# Patient Record
Sex: FEMALE | Race: BLACK OR AFRICAN AMERICAN | URBAN - METROPOLITAN AREA
[De-identification: names, ages, dates, MRNs, and addresses within clinical notes are randomized per-mention and may not be internally consistent; named-entity substitution may affect disease eponyms.]

---

## 2020-06-17 ENCOUNTER — LAB OUTSIDE AN ENCOUNTER (OUTPATIENT)
Dept: URBAN - METROPOLITAN AREA CLINIC 23 | Facility: CLINIC | Age: 45
End: 2020-06-17

## 2020-06-17 ENCOUNTER — OFFICE VISIT (OUTPATIENT)
Dept: URBAN - METROPOLITAN AREA CLINIC 23 | Facility: CLINIC | Age: 45
End: 2020-06-17
Payer: COMMERCIAL

## 2020-06-17 ENCOUNTER — WEB ENCOUNTER (OUTPATIENT)
Dept: URBAN - METROPOLITAN AREA CLINIC 23 | Facility: CLINIC | Age: 45
End: 2020-06-17

## 2020-06-17 DIAGNOSIS — R11.2 NAUSEA AND VOMITING: ICD-10-CM

## 2020-06-17 DIAGNOSIS — R10.13 EPIGASTRIC PAIN: ICD-10-CM

## 2020-06-17 DIAGNOSIS — R14.0 ABDOMINAL BLOATING: ICD-10-CM

## 2020-06-17 DIAGNOSIS — R12 HEARTBURN: ICD-10-CM

## 2020-06-17 DIAGNOSIS — R19.7 DIARRHEA: ICD-10-CM

## 2020-06-17 PROCEDURE — G8417 CALC BMI ABV UP PARAM F/U: HCPCS | Performed by: INTERNAL MEDICINE

## 2020-06-17 PROCEDURE — 1036F TOBACCO NON-USER: CPT | Performed by: INTERNAL MEDICINE

## 2020-06-17 PROCEDURE — 99204 OFFICE O/P NEW MOD 45 MIN: CPT | Performed by: INTERNAL MEDICINE

## 2020-06-17 PROCEDURE — G8427 DOCREV CUR MEDS BY ELIG CLIN: HCPCS | Performed by: INTERNAL MEDICINE

## 2020-06-17 PROCEDURE — G9903 PT SCRN TBCO ID AS NON USER: HCPCS | Performed by: INTERNAL MEDICINE

## 2020-06-17 NOTE — HPI-TODAY'S VISIT:
- 43 yo  female from [place], referred by Dr. El Reynaga for further evaluation of GI complaints as detailed below, which have been ongoing for approximately 5 years but which have progressed over the past 6 months.  Her symptoms were previously intermittent but now they have become constant. - Abdominal pain which is constant.  Located in epigastric and LUQ areas and may radiate up into her chest.  Describes it shooting pains which range from 6-10/10 in intensity.   - Associated symptoms include abdominal bloating, nausea, vomiting, heartburn, dyspepsia.  In addition, she states that most days of the week she has diarrhea.  Rare constipation. - She is taking ranitidine and omeprazole as needed, with poor control of symptoms. - Does not take ASA or NSAIDs - Consumes about 9 alcoholic beverages per week

## 2020-06-26 ENCOUNTER — LAB OUTSIDE AN ENCOUNTER (OUTPATIENT)
Dept: URBAN - METROPOLITAN AREA CLINIC 23 | Facility: CLINIC | Age: 45
End: 2020-06-26

## 2020-06-29 LAB — GASTROINTESTINAL PATHOGEN: (no result)

## 2020-07-02 LAB
A/G RATIO: 1.2
ALBUMIN: 4.1
ALKALINE PHOSPHATASE: 83
ALT (SGPT): 16
AST (SGOT): 17
BASO (ABSOLUTE): 0
BASOS: 1
BILIRUBIN, TOTAL: 0.2
BUN/CREATININE RATIO: 8
BUN: 5
C-REACTIVE PROTEIN, QUANT: 1
CALCIUM: 9.1
CARBON DIOXIDE, TOTAL: 23
CHLORIDE: 100
CREATININE: 0.65
EGFR IF AFRICN AM: 123
EGFR IF NONAFRICN AM: 107
EOS (ABSOLUTE): 0.1
EOS: 1
GLOBULIN, TOTAL: 3.3
GLUCOSE: 97
HEMATOCRIT: 39.5
HEMATOLOGY COMMENTS:: (no result)
HEMOGLOBIN: 13.7
IMMATURE CELLS: (no result)
IMMATURE GRANS (ABS): 0
IMMATURE GRANULOCYTES: 0
IMMUNOGLOBULIN A, QN, SERUM: 361
LYMPHS (ABSOLUTE): 2.5
LYMPHS: 29
MCH: 30.7
MCHC: 34.7
MCV: 89
MONOCYTES(ABSOLUTE): 0.6
MONOCYTES: 7
NEUTROPHILS (ABSOLUTE): 5.5
NEUTROPHILS: 62
NRBC: (no result)
PLATELETS: 346
POTASSIUM: 4.6
PROTEIN, TOTAL: 7.4
RBC: 4.46
RDW: 13.4
SEDIMENTATION RATE-WESTERGREN: 6
SODIUM: 140
T-TRANSGLUTAMINASE (TTG) IGA: <2
WBC: 8.8

## 2020-07-08 ENCOUNTER — LAB OUTSIDE AN ENCOUNTER (OUTPATIENT)
Dept: URBAN - METROPOLITAN AREA CLINIC 92 | Facility: CLINIC | Age: 45
End: 2020-07-08

## 2020-07-08 ENCOUNTER — OFFICE VISIT (OUTPATIENT)
Dept: URBAN - METROPOLITAN AREA SURGERY CENTER 15 | Facility: SURGERY CENTER | Age: 45
End: 2020-07-08
Payer: COMMERCIAL

## 2020-07-08 ENCOUNTER — TELEPHONE ENCOUNTER (OUTPATIENT)
Dept: URBAN - METROPOLITAN AREA CLINIC 92 | Facility: CLINIC | Age: 45
End: 2020-07-08

## 2020-07-08 DIAGNOSIS — R19.4 ALTERED BOWEL HABITS: ICD-10-CM

## 2020-07-08 DIAGNOSIS — K21.9 ACID REFLUX: ICD-10-CM

## 2020-07-08 DIAGNOSIS — K31.89 ACQUIRED DEFORMITY OF PYLORUS: ICD-10-CM

## 2020-07-08 DIAGNOSIS — K29.80 ACUTE DUODENITIS: ICD-10-CM

## 2020-07-08 DIAGNOSIS — D12.0 BENIGN NEOPLASM OF CECUM: ICD-10-CM

## 2020-07-08 DIAGNOSIS — K31.7 BENIGN GASTRIC POLYP: ICD-10-CM

## 2020-07-08 PROCEDURE — G8907 PT DOC NO EVENTS ON DISCHARG: HCPCS | Performed by: INTERNAL MEDICINE

## 2020-07-08 PROCEDURE — 45380 COLONOSCOPY AND BIOPSY: CPT | Performed by: INTERNAL MEDICINE

## 2020-07-08 PROCEDURE — 45385 COLONOSCOPY W/LESION REMOVAL: CPT | Performed by: INTERNAL MEDICINE

## 2020-07-08 PROCEDURE — 43239 EGD BIOPSY SINGLE/MULTIPLE: CPT | Performed by: INTERNAL MEDICINE

## 2020-07-08 PROCEDURE — 43251 EGD REMOVE LESION SNARE: CPT | Performed by: INTERNAL MEDICINE

## 2020-07-08 PROCEDURE — G9937 DIG OR SURV COLSCO: HCPCS | Performed by: INTERNAL MEDICINE

## 2020-08-17 ENCOUNTER — OFFICE VISIT (OUTPATIENT)
Dept: URBAN - METROPOLITAN AREA TELEHEALTH 2 | Facility: TELEHEALTH | Age: 45
End: 2020-08-17
Payer: COMMERCIAL

## 2020-08-17 DIAGNOSIS — R93.2 ABNORMAL GALL BLADDER DIAGNOSTIC IMAGING: ICD-10-CM

## 2020-08-17 DIAGNOSIS — K63.5 COLON POLYPS: ICD-10-CM

## 2020-08-17 DIAGNOSIS — R93.5 ABNORMAL CT OF THE ABDOMEN: ICD-10-CM

## 2020-08-17 PROCEDURE — 1036F TOBACCO NON-USER: CPT | Performed by: INTERNAL MEDICINE

## 2020-08-17 PROCEDURE — G8420 CALC BMI NORM PARAMETERS: HCPCS | Performed by: INTERNAL MEDICINE

## 2020-08-17 PROCEDURE — 99214 OFFICE O/P EST MOD 30 MIN: CPT | Performed by: INTERNAL MEDICINE

## 2020-08-17 PROCEDURE — G8427 DOCREV CUR MEDS BY ELIG CLIN: HCPCS | Performed by: INTERNAL MEDICINE

## 2020-08-17 PROCEDURE — G9903 PT SCRN TBCO ID AS NON USER: HCPCS | Performed by: INTERNAL MEDICINE

## 2020-08-17 NOTE — HPI-TODAY'S VISIT:
- 43 yo female - Two months ago had negative bloodwork and negative stool PCR for infectious pathogens.   - 7/8/2020 E/C with tubular adenoma - She also had an abdominopelvic CT done 2 months ago which revealed a 4 cm ovarian dermoid cyst and was suggestive of cholelithiasis.  I had ordered a RUQ ultrasound for further evaluation but the patient has not yet had it done. - She is taking omeprazole 20 mg daily and feels better from the upper GI symptoms she previously described below  - States she saw her gynecologist earlier today regarding her ovarian dermoid cyst and that she had a transvaginal ultrasound done earlier today  - Has cut down on her alcohol use to 5 glasses of wine per week

## 2020-08-28 ENCOUNTER — OFFICE VISIT (OUTPATIENT)
Dept: URBAN - METROPOLITAN AREA TELEHEALTH 2 | Facility: TELEHEALTH | Age: 45
End: 2020-08-28

## 2020-09-05 ENCOUNTER — DASHBOARD ENCOUNTERS (OUTPATIENT)
Age: 45
End: 2020-09-05

## 2020-12-08 ENCOUNTER — INPATIENT (INPATIENT)
Facility: HOSPITAL | Age: 45
LOS: 1 days | Discharge: HOME | DRG: 460 | End: 2020-12-10
Attending: ORTHOPAEDIC SURGERY | Admitting: ORTHOPAEDIC SURGERY
Payer: MEDICARE

## 2020-12-08 VITALS
RESPIRATION RATE: 16 BRPM | HEIGHT: 63 IN | OXYGEN SATURATION: 99 % | SYSTOLIC BLOOD PRESSURE: 122 MMHG | HEART RATE: 67 BPM | DIASTOLIC BLOOD PRESSURE: 69 MMHG | TEMPERATURE: 97 F | WEIGHT: 173.06 LBS

## 2020-12-08 DIAGNOSIS — Z98.890 OTHER SPECIFIED POSTPROCEDURAL STATES: Chronic | ICD-10-CM

## 2020-12-08 DIAGNOSIS — M54.16 RADICULOPATHY, LUMBAR REGION: ICD-10-CM

## 2020-12-08 LAB
ANION GAP SERPL CALC-SCNC: 10 MMOL/L — SIGNIFICANT CHANGE UP (ref 5–17)
BUN SERPL-MCNC: 14 MG/DL — SIGNIFICANT CHANGE UP (ref 7–23)
CALCIUM SERPL-MCNC: 8.6 MG/DL — SIGNIFICANT CHANGE UP (ref 8.4–10.5)
CHLORIDE SERPL-SCNC: 101 MMOL/L — SIGNIFICANT CHANGE UP (ref 96–108)
CO2 SERPL-SCNC: 24 MMOL/L — SIGNIFICANT CHANGE UP (ref 22–31)
CREAT SERPL-MCNC: 1.06 MG/DL — SIGNIFICANT CHANGE UP (ref 0.5–1.3)
GLUCOSE SERPL-MCNC: 115 MG/DL — HIGH (ref 70–99)
POTASSIUM SERPL-MCNC: 5 MMOL/L — SIGNIFICANT CHANGE UP (ref 3.5–5.3)
POTASSIUM SERPL-SCNC: 5 MMOL/L — SIGNIFICANT CHANGE UP (ref 3.5–5.3)
SODIUM SERPL-SCNC: 135 MMOL/L — SIGNIFICANT CHANGE UP (ref 135–145)

## 2020-12-08 RX ORDER — HYDROMORPHONE HYDROCHLORIDE 2 MG/ML
0.5 INJECTION INTRAMUSCULAR; INTRAVENOUS; SUBCUTANEOUS EVERY 4 HOURS
Refills: 0 | Status: ACTIVE | OUTPATIENT
Start: 2020-12-08 | End: 2020-12-15

## 2020-12-08 RX ORDER — MAGNESIUM SULFATE 500 MG/ML
2 VIAL (ML) INJECTION
Refills: 0 | Status: DISCONTINUED | OUTPATIENT
Start: 2020-12-08 | End: 2020-12-10

## 2020-12-08 RX ORDER — ACETAMINOPHEN 500 MG
1000 TABLET ORAL ONCE
Refills: 0 | Status: COMPLETED | OUTPATIENT
Start: 2020-12-08 | End: 2020-12-08

## 2020-12-08 RX ORDER — DEXAMETHASONE 0.5 MG/5ML
10 ELIXIR ORAL EVERY 8 HOURS
Refills: 0 | Status: COMPLETED | OUTPATIENT
Start: 2020-12-08 | End: 2020-12-09

## 2020-12-08 RX ORDER — SODIUM CHLORIDE 9 MG/ML
1000 INJECTION, SOLUTION INTRAVENOUS
Refills: 0 | Status: DISCONTINUED | OUTPATIENT
Start: 2020-12-08 | End: 2020-12-09

## 2020-12-08 RX ORDER — ACETAMINOPHEN 500 MG
650 TABLET ORAL EVERY 6 HOURS
Refills: 0 | Status: ACTIVE | OUTPATIENT
Start: 2020-12-08 | End: 2021-11-06

## 2020-12-08 RX ORDER — CYCLOBENZAPRINE HYDROCHLORIDE 10 MG/1
5 TABLET, FILM COATED ORAL EVERY 8 HOURS
Refills: 0 | Status: ACTIVE | OUTPATIENT
Start: 2020-12-08 | End: 2021-11-06

## 2020-12-08 RX ORDER — APREPITANT 80 MG/1
40 CAPSULE ORAL ONCE
Refills: 0 | Status: COMPLETED | OUTPATIENT
Start: 2020-12-08 | End: 2020-12-08

## 2020-12-08 RX ORDER — POVIDONE-IODINE 5 %
1 AEROSOL (ML) TOPICAL ONCE
Refills: 0 | Status: COMPLETED | OUTPATIENT
Start: 2020-12-08 | End: 2020-12-08

## 2020-12-08 RX ORDER — CEFAZOLIN SODIUM 1 G
2000 VIAL (EA) INJECTION EVERY 8 HOURS
Refills: 0 | Status: COMPLETED | OUTPATIENT
Start: 2020-12-08 | End: 2020-12-09

## 2020-12-08 RX ORDER — GABAPENTIN 400 MG/1
1 CAPSULE ORAL
Qty: 0 | Refills: 0 | DISCHARGE

## 2020-12-08 RX ORDER — OXYCODONE HYDROCHLORIDE 5 MG/1
5 TABLET ORAL EVERY 4 HOURS
Refills: 0 | Status: ACTIVE | OUTPATIENT
Start: 2020-12-08 | End: 2020-12-15

## 2020-12-08 RX ORDER — INFLUENZA VIRUS VACCINE 15; 15; 15; 15 UG/.5ML; UG/.5ML; UG/.5ML; UG/.5ML
0.5 SUSPENSION INTRAMUSCULAR ONCE
Refills: 0 | Status: ACTIVE | OUTPATIENT
Start: 2020-12-08 | End: 2021-11-06

## 2020-12-08 RX ORDER — ONDANSETRON 8 MG/1
4 TABLET, FILM COATED ORAL EVERY 6 HOURS
Refills: 0 | Status: ACTIVE | OUTPATIENT
Start: 2020-12-08 | End: 2021-11-06

## 2020-12-08 RX ORDER — GABAPENTIN 400 MG/1
300 CAPSULE ORAL ONCE
Refills: 0 | Status: COMPLETED | OUTPATIENT
Start: 2020-12-08 | End: 2020-12-08

## 2020-12-08 RX ORDER — HYDROMORPHONE HYDROCHLORIDE 2 MG/ML
0.5 INJECTION INTRAMUSCULAR; INTRAVENOUS; SUBCUTANEOUS
Refills: 0 | Status: DISCONTINUED | OUTPATIENT
Start: 2020-12-08 | End: 2020-12-10

## 2020-12-08 RX ORDER — OXYCODONE HYDROCHLORIDE 5 MG/1
10 TABLET ORAL EVERY 4 HOURS
Refills: 0 | Status: ACTIVE | OUTPATIENT
Start: 2020-12-08 | End: 2020-12-15

## 2020-12-08 RX ORDER — CHLORHEXIDINE GLUCONATE 213 G/1000ML
1 SOLUTION TOPICAL ONCE
Refills: 0 | Status: COMPLETED | OUTPATIENT
Start: 2020-12-08 | End: 2020-12-08

## 2020-12-08 RX ADMIN — CYCLOBENZAPRINE HYDROCHLORIDE 5 MILLIGRAM(S): 10 TABLET, FILM COATED ORAL at 22:00

## 2020-12-08 RX ADMIN — Medication 2000 MILLIGRAM(S): at 22:00

## 2020-12-08 RX ADMIN — HYDROMORPHONE HYDROCHLORIDE 0.5 MILLIGRAM(S): 2 INJECTION INTRAMUSCULAR; INTRAVENOUS; SUBCUTANEOUS at 18:00

## 2020-12-08 RX ADMIN — Medication 1000 MILLIGRAM(S): at 11:46

## 2020-12-08 RX ADMIN — Medication 102 MILLIGRAM(S): at 22:01

## 2020-12-08 RX ADMIN — APREPITANT 40 MILLIGRAM(S): 80 CAPSULE ORAL at 11:47

## 2020-12-08 RX ADMIN — Medication 650 MILLIGRAM(S): at 23:44

## 2020-12-08 RX ADMIN — Medication 1 APPLICATION(S): at 11:48

## 2020-12-08 RX ADMIN — GABAPENTIN 300 MILLIGRAM(S): 400 CAPSULE ORAL at 11:48

## 2020-12-08 RX ADMIN — OXYCODONE HYDROCHLORIDE 10 MILLIGRAM(S): 5 TABLET ORAL at 23:44

## 2020-12-08 RX ADMIN — CHLORHEXIDINE GLUCONATE 1 APPLICATION(S): 213 SOLUTION TOPICAL at 11:47

## 2020-12-08 RX ADMIN — HYDROMORPHONE HYDROCHLORIDE 0.5 MILLIGRAM(S): 2 INJECTION INTRAMUSCULAR; INTRAVENOUS; SUBCUTANEOUS at 17:45

## 2020-12-08 NOTE — H&P ADULT - PROBLEM SELECTOR PLAN 1
Admit to Orthopaedic Service.  Presents today for elective PSF L4-S1  Pt medically stable and cleared for procedure today by Dr. Silverman.

## 2020-12-08 NOTE — PACU DISCHARGE NOTE - COMMENTS
pt aao x3.  VSS.  ioban dsg to back with drainage noted; no oozing.  hemovac x1 to ss.  denies c/o pain at present.  report given to RN christian chamorro.  pt to go to 841 via bed with transport

## 2020-12-08 NOTE — H&P ADULT - NSICDXPASTMEDICALHX_GEN_ALL_CORE_FT
PAST MEDICAL HISTORY:  Radiculopathy of lumbar region      PAST MEDICAL HISTORY:  HLD (hyperlipidemia)     HTN (hypertension)     Radiculopathy of lumbar region     Vitamin D deficiency

## 2020-12-08 NOTE — H&P ADULT - NSHPPHYSICALEXAM_GEN_ALL_CORE
GENERAL:  PE:  SLT INTACT, DP/PT 2+, EHL/TA/GS   Decreased ROM secondary to pain. Rest of PE per medical clearance.

## 2020-12-08 NOTE — PROGRESS NOTE ADULT - SUBJECTIVE AND OBJECTIVE BOX
Ortho Post Op Check    Procedure: Lami, PSF L4-L5  Surgeon: Best     Pt comfortable without complaints, pain controlled  Denies CP, SOB, N/V, numbness/tingling     Vital Signs Last 24 Hrs  T(C): 36.6 (12-08-20 @ 21:31), Max: 36.6 (12-08-20 @ 21:31)  T(F): 97.8 (12-08-20 @ 21:31), Max: 97.8 (12-08-20 @ 21:31)  HR: 67 (12-08-20 @ 21:31) (58 - 72)  BP: 135/83 (12-08-20 @ 21:31) (96/55 - 135/83)  BP(mean): 72 (12-08-20 @ 20:00) (70 - 85)  RR: 16 (12-08-20 @ 21:31) (8 - 22)  SpO2: 100% (12-08-20 @ 21:31) (100% - 100%)  AVSS    General: Pt Alert and oriented, NAD  Back DSG C/D/I  HV x1 in place holding suction   Sensation intact BL LE  Motor intact BL LE    08 Dec 2020 17:41    135    |  101    |  14     ----------------------------<  115    5.0     |  24     |  1.06       Drain output:    12-08-20 @ 07:01  -  12-08-20 @ 23:54  --------------------------------------------------------  OUT:    Accordian (mL): 0 mL  Total OUT: 0 mL    Post-op X-Ray: Intraop fluoro    A/P: 44yFemale POD#0 s/p Lami, PSF L4-L5 12/8  - Stable  - Pain Control  - DVT ppx: SCDs  - Post op abx: ancef periop  - PT, WBS: WBAT  - Monitor drain output  - Dispo pending     Ortho Pager [phone]

## 2020-12-09 DIAGNOSIS — F12.10 CANNABIS ABUSE, UNCOMPLICATED: ICD-10-CM

## 2020-12-09 DIAGNOSIS — F32.9 MAJOR DEPRESSIVE DISORDER, SINGLE EPISODE, UNSPECIFIED: ICD-10-CM

## 2020-12-09 LAB — MELD SCORE WITH DIALYSIS: 7 POINTS — SIGNIFICANT CHANGE UP

## 2020-12-09 PROCEDURE — 99223 1ST HOSP IP/OBS HIGH 75: CPT

## 2020-12-09 PROCEDURE — 99223 1ST HOSP IP/OBS HIGH 75: CPT | Mod: GC

## 2020-12-09 RX ORDER — GABAPENTIN 400 MG/1
300 CAPSULE ORAL DAILY
Refills: 0 | Status: ACTIVE | OUTPATIENT
Start: 2020-12-09 | End: 2021-11-07

## 2020-12-09 RX ORDER — SENNA PLUS 8.6 MG/1
2 TABLET ORAL AT BEDTIME
Refills: 0 | Status: ACTIVE | OUTPATIENT
Start: 2020-12-09 | End: 2021-11-07

## 2020-12-09 RX ORDER — POLYETHYLENE GLYCOL 3350 17 G/17G
17 POWDER, FOR SOLUTION ORAL DAILY
Refills: 0 | Status: ACTIVE | OUTPATIENT
Start: 2020-12-09 | End: 2021-11-07

## 2020-12-09 RX ADMIN — Medication 102 MILLIGRAM(S): at 06:31

## 2020-12-09 RX ADMIN — Medication 650 MILLIGRAM(S): at 00:44

## 2020-12-09 RX ADMIN — Medication 650 MILLIGRAM(S): at 16:04

## 2020-12-09 RX ADMIN — Medication 650 MILLIGRAM(S): at 11:03

## 2020-12-09 RX ADMIN — Medication 650 MILLIGRAM(S): at 17:05

## 2020-12-09 RX ADMIN — Medication 650 MILLIGRAM(S): at 05:45

## 2020-12-09 RX ADMIN — Medication 650 MILLIGRAM(S): at 06:59

## 2020-12-09 RX ADMIN — SENNA PLUS 2 TABLET(S): 8.6 TABLET ORAL at 21:17

## 2020-12-09 RX ADMIN — Medication 2000 MILLIGRAM(S): at 06:31

## 2020-12-09 RX ADMIN — OXYCODONE HYDROCHLORIDE 10 MILLIGRAM(S): 5 TABLET ORAL at 22:18

## 2020-12-09 RX ADMIN — GABAPENTIN 300 MILLIGRAM(S): 400 CAPSULE ORAL at 11:03

## 2020-12-09 RX ADMIN — CYCLOBENZAPRINE HYDROCHLORIDE 5 MILLIGRAM(S): 10 TABLET, FILM COATED ORAL at 13:36

## 2020-12-09 RX ADMIN — OXYCODONE HYDROCHLORIDE 10 MILLIGRAM(S): 5 TABLET ORAL at 05:24

## 2020-12-09 RX ADMIN — Medication 102 MILLIGRAM(S): at 13:36

## 2020-12-09 RX ADMIN — HYDROMORPHONE HYDROCHLORIDE 0.5 MILLIGRAM(S): 2 INJECTION INTRAMUSCULAR; INTRAVENOUS; SUBCUTANEOUS at 23:47

## 2020-12-09 RX ADMIN — CYCLOBENZAPRINE HYDROCHLORIDE 5 MILLIGRAM(S): 10 TABLET, FILM COATED ORAL at 21:17

## 2020-12-09 RX ADMIN — OXYCODONE HYDROCHLORIDE 10 MILLIGRAM(S): 5 TABLET ORAL at 04:24

## 2020-12-09 RX ADMIN — OXYCODONE HYDROCHLORIDE 10 MILLIGRAM(S): 5 TABLET ORAL at 21:18

## 2020-12-09 RX ADMIN — Medication 650 MILLIGRAM(S): at 22:11

## 2020-12-09 RX ADMIN — Medication 650 MILLIGRAM(S): at 23:11

## 2020-12-09 RX ADMIN — POLYETHYLENE GLYCOL 3350 17 GRAM(S): 17 POWDER, FOR SOLUTION ORAL at 11:03

## 2020-12-09 RX ADMIN — CYCLOBENZAPRINE HYDROCHLORIDE 5 MILLIGRAM(S): 10 TABLET, FILM COATED ORAL at 06:32

## 2020-12-09 RX ADMIN — OXYCODONE HYDROCHLORIDE 10 MILLIGRAM(S): 5 TABLET ORAL at 00:44

## 2020-12-09 RX ADMIN — Medication 650 MILLIGRAM(S): at 12:17

## 2020-12-09 NOTE — DISCHARGE NOTE PROVIDER - CARE PROVIDER_API CALL
Jah Jewell (DO)  Orthopaedic Surgery  [address] [place], [place] [address]  Phone: [phone]  Fax: [phone]  Follow Up Time: 2 weeks

## 2020-12-09 NOTE — PHYSICAL THERAPY INITIAL EVALUATION ADULT - PLANNED THERAPY INTERVENTIONS, PT EVAL
transfer training/bed mobility training/gait training/strengthening/balance training/neuromuscular re-education

## 2020-12-09 NOTE — BEHAVIORAL HEALTH ASSESSMENT NOTE - OTHER PAST PSYCHIATRIC HISTORY (INCLUDE DETAILS REGARDING ONSET, COURSE OF ILLNESS, INPATIENT/OUTPATIENT TREATMENT)
Self-reported history of depression v bipolar disorder, diagnosed years ago (unclear timeline), no history of psychiatric hospitalizations or suicide attempts. previously in outpt treatment at clinic (pt unsure of name, ?[facility]) in the [place] for medication management and psychotherapy until February 2020 when discharged due to nonadherence. Prior trials of Neurontin ?dose TID and Zoloft 100mg

## 2020-12-09 NOTE — PROGRESS NOTE ADULT - SUBJECTIVE AND OBJECTIVE BOX
pt seen and examined nad avss    pe    dressing cdi   nvi  power 5/5  sensation grossly intact  no calf tenderness    sp psf    plan    ambulation  pt   pain control  scd  xrays  brace

## 2020-12-09 NOTE — BEHAVIORAL HEALTH ASSESSMENT NOTE - HPI (INCLUDE ILLNESS QUALITY, SEVERITY, DURATION, TIMING, CONTEXT, MODIFYING FACTORS, ASSOCIATED SIGNS AND SYMPTOMS)
43yo woman, single, domiciled, unemployed, with a self-reported history of depression v. bipolar and cannabis abuse, medical history including HTN, HLD and lumbar radiculopathy who is admitted to orthopedics service for elective poster spinal fusion surgery, currently post-op day 1. Pt reportedly off of her prior antidepressant medications ?gabapentin and sertraline for months to years and requesting to resume. Psychiatry consulted for medication management for depression.    On evaluation this afternoon, pt found awake, alert, calm, intermittently tearful and talkative on approach. She spoke at length about recent months of low mood with reactive mood swings (periods of tearfulness, anger, throwing objects when upset) that she related to stress at home from conflict with her 19yo daughter, legal problems related to daughter (recently out of juvenile [employer]), and debilitating back/foot pain limiting her daily activities. She denied anhedonia, hopelessness, helplessness, or any recent SI or self-harm, though reports a history of passive suicidal thoughts (thoughts of giving up, of life being easier if she were dead) in the distant past. She denies recent symptoms suggestive of laurie or psychosis such as periods of sustained elevated mood, decreased need for sleep, grandiosity, perceptual disturbances, delusional beliefs. She reported daily use of "a lot" of cannabis to help cope with her stress, as well as heavy cigarette use and coffee drinking. She reported a history of violent episodes toward others when upset but denied recent aggressive behaviors, stating that she sometimes throws objects but does not try to harm others, and that she has found coloring books and thinking about her pets (dog, bird) helpful for relaxation when under stress. She reports prior success in reducing cannabis use when in outpt psychiatric tx at a clinic in the  for "depression or bipolar" but reports she was discharged in Feb 2020 due to appt nonadherence (states could not attend due to mobility issues and multiple other medical appts). She would like to resume outpt tx and be considered for restarting Zoloft and Neurontin as found previously helpful. She especially misses having a therapist to talk to for support, as she denies any close family or friend contacts though feels connected to her three adult children.    SH: lives with 19yo daughter. Unemployed, previously worked as . Has three adult children, two grandchildren. 43yo woman, single, domiciled, unemployed, with a self-reported history of depression v. bipolar and cannabis abuse, medical history including HTN, HLD and lumbar radiculopathy who is admitted to orthopedics service for elective poster spinal fusion surgery, currently post-op day 1. Pt reportedly off of her prior antidepressant medications ?gabapentin and sertraline for months to years and requesting to resume. Psychiatry consulted for medication management for depression.    On evaluation this afternoon, pt found awake, alert, calm, intermittently tearful and talkative on approach. She spoke at length about recent months of low mood with reactive mood swings (periods of tearfulness, anger, throwing objects when upset) that she related to stress at home from conflict with her 19yo daughter, legal problems related to daughter (recently out of juvenile [employer]), and debilitating back/foot pain limiting her daily activities. She denied anhedonia, hopelessness, helplessness, or any recent SI or self-harm, though reports a history of passive suicidal thoughts (thoughts of giving up, of life being easier if she were dead) in the distant past. Her sleep has been poor for years with frequent awakenings and daytime fatigue. She denies recent symptoms suggestive of laurie or psychosis such as periods of sustained elevated mood, decreased need for sleep, grandiosity, perceptual disturbances, delusional beliefs. She reported daily use of "a lot" of cannabis to help cope with her stress, as well as heavy cigarette use and coffee drinking. She reported a history of violent episodes toward others when upset but denied recent aggressive behaviors, stating that she sometimes throws objects but does not try to harm others, and that she has found coloring books and thinking about her pets (dog, bird) helpful for relaxation when under stress. She reports prior success in reducing cannabis use when in outpt psychiatric tx at a clinic in the  for "depression or bipolar" but reports she was discharged in Feb 2020 due to appt nonadherence (states could not attend due to mobility issues and multiple other medical appts). She would like to resume outpt tx and be considered for restarting Zoloft and Neurontin as found previously helpful. She especially misses having a therapist to talk to for support, as she denies any close family or friend contacts though feels connected to her three adult children.    SH: lives with 19yo daughter. Unemployed, previously worked as . Has three adult children, two grandchildren.

## 2020-12-09 NOTE — PHYSICAL THERAPY INITIAL EVALUATION ADULT - GROSSLY INTACT, SENSORY
Grossly intact to LT on B/L LE; pt. reports intermittent numbness/tingling occasionally in B/L foot/ankle, no deficit seen in exam

## 2020-12-09 NOTE — PROGRESS NOTE ADULT - SUBJECTIVE AND OBJECTIVE BOX
Ortho Note    Pt comfortable without complaints, pain controlled  Denies CP, SOB, N/V, numbness/tingling     Vital Signs Last 24 Hrs  T(C): 36.6 (12-09-20 @ 08:42), Max: 36.6 (12-09-20 @ 08:42)  T(F): 97.9 (12-09-20 @ 08:42), Max: 97.9 (12-09-20 @ 08:42)  HR: 72 (12-09-20 @ 08:42) (62 - 72)  BP: 103/52 (12-09-20 @ 08:42) (103/52 - 129/78)  BP(mean): --  RR: 16 (12-09-20 @ 08:42) (16 - 17)  SpO2: 95% (12-09-20 @ 08:42) (95% - 98%)  AVSS    General: Pt Alert and oriented, NAD  DSG: lumbar IOBAN C/D/I, hv x1 to suction (40cc)  Pulses: bilateral pedal 2+, wwp toes, cap refill <3sec toes  Sensation: bilateral SILT  Motor: bilateral 5/5 EHL/FHL/TA/GS    09 Dec 2020 07:22    135    |  x      |  x      ----------------------------<  x      x       |  x      |  0.94       TPro  x      /  Alb  x      /  TBili  0.3    /  DBili  x      /  AST  x      /  ALT  x      /  AlkPhos  x      09 Dec 2020 07:22      A/P: 44y Female POD#1 s/p PSF L4-S1  - Stable, afebrile, nontoxic appearance  - Pain Control: po meds  - xray in am standing  - DVT ppx: SCDs  - PT, WBS: WBAT spinal precautions  -Bowel regimen: continue bowel regimen  -Dispo: home pending PT clearance and drain output    Ortho Pager [phone]

## 2020-12-09 NOTE — DISCHARGE NOTE PROVIDER - NSDCACTIVITY_GEN_ALL_CORE
Do not drive or operate machinery/Do not make important decisions/Walking - Indoors allowed/No heavy lifting/straining Do not drive or operate machinery/Do not make important decisions/Stairs allowed/Walking - Indoors allowed/No heavy lifting/straining/Walking - Outdoors allowed

## 2020-12-09 NOTE — CONSULT NOTE ADULT - ASSESSMENT
44 year old F w/ HTN, HLD s/p elective PSF, doing well post-op, medicine following for co-management.     #Post-op care: c/w pain control, c/w bowel regimen, c/w IS  #HTN: well controlled here off BP meds, outpatient PCP f/u  #Psychiatric condition: obtain collateral and restart her home meds  #DVT ppx: per ortho

## 2020-12-09 NOTE — BEHAVIORAL HEALTH ASSESSMENT NOTE - SUICIDE RISK FACTORS
Mood Disorder current/past/Cluster B Personality disorders or traits current/past/Alcohol/Substance abuse disorders/Current mood episode/Insomnia

## 2020-12-09 NOTE — BEHAVIORAL HEALTH ASSESSMENT NOTE - DIFFERENTIAL
Unspecified depressive d/o, likely MDD, recurrent v. adjustment d/o, unlikely bipolar disorder, r/o substance induced mood disorder  cannabis use disorder  cluster B personality traits

## 2020-12-09 NOTE — PHYSICAL THERAPY INITIAL EVALUATION ADULT - ADDITIONAL COMMENTS
Pt. reports independence w/ functional mobility & ADL's/IADL's PTA w/ use of SC in household & motorized scooter for community mobility; however, pt. reports gradual increase in difficulty since symptoms have worsened prior to sx. Pt. states that she lives alone on fifth floor of a walk-up apartment (5 [address]), & that stair negotiation had also become increasingly difficult PTA.

## 2020-12-09 NOTE — DISCHARGE NOTE PROVIDER - NSDCCPCAREPLAN_GEN_ALL_CORE_FT
PRINCIPAL DISCHARGE DIAGNOSIS  Diagnosis: Radiculopathy of lumbar region  Assessment and Plan of Treatment: Radiculopathy of lumbar region       PRINCIPAL DISCHARGE DIAGNOSIS  Diagnosis: Radiculopathy of lumbar region  Assessment and Plan of Treatment: s/p PSF L4-S1

## 2020-12-09 NOTE — BEHAVIORAL HEALTH ASSESSMENT NOTE - RISK ASSESSMENT
Low Acute Suicide Risk assessed at low acute risk for suicide given absence of current or recent SI, no known suicide attempts, future-orientation, tx seeking behavior, with sense of responsibility to children and grandchildren, residential stability  at somewhat elevated chronic risk for harm to self given history of mood disorder and past SI, heavy cannabis abuse, multiple psychosocial stressors, recent lack of engagement in mental health tx  risk mitigated by support in hospital, counseling re: substance use, referral back to mental health tx

## 2020-12-09 NOTE — BEHAVIORAL HEALTH ASSESSMENT NOTE - SUMMARY
RECOMMENDATIONS  -no need for sitter for safety or indication for inpatient psychiatric care  -no acute indication to resume antidepressant medications pending outpt assessment given recent nonadherence and heavy cannabis use  -counseling provided re: cannabis use and potential impact on mood  -recommend offering nicotine replacement  -encourage use positive coping strategies (coloring, talking with staff). If remains in hospital, can offer creative arts therapy  -recommend referral to outpt psychiatric care for medication management and psychotherapy. Referral information provided below  -no psychiatric barrier to discharge. Please contact with questions 45yo woman with a self-reported history of depression v. bipolar and cannabis abuse, medical history including HTN, HLD and lumbar radiculopathy who presents with depressed mood in setting of multiple recent psychosocial stressors, nonadherence with outpatient mental health care, daily heavy cannabis abuse, and hospitalization for spinal fusion.     On exam, pt presents as tearful and emotionally labile, endorsing a long history of difficulty with reactive mood swings and affective dyregulation leading to relationship conflicts, legal difficulties, and impulsive behaviors suggestive of a possible cluster B personality disorder. She endorses recent elevated low mood attributed to stopping antidepressant medication since February due to appt nonadherence, heightened conflict with 19yo daughter (home from juvenile [employer]), and debilitating low back pain. While clearly distressed by her symptoms, with low mood, irritability, social isolation, and insomnia, she is future-oriented and treatment-seeking, able to identify protective factors (her children and grandchildren) and motivators (hopeful about reduced pain post-surgery), without hopelessness, helplessness or any recent aggressive behaviors, SI or self-harm. History and exam suggestive of a possible underlying major depressive disorder versus substance-induced mood disorder given daily heavy cannabis use for years; no clear history of laurie or manic sx on exam today consistent with a bipolar-spectrum illness.  Pt would most benefit from cannabis cessation and engagement in outpatient mental health care for further diagnostic clarification and assessment for medication management and psychotherapy, particularly with focus on developing positive coping strategies. No indication for inpatient level care.    RECOMMENDATIONS  -no need for sitter for safety or indication for inpatient psychiatric care  -no acute indication to resume antidepressant medications pending outpt assessment given recent nonadherence and heavy cannabis use  -counseling provided re: cannabis use and potential impact on mood  -recommend offering nicotine replacement  -encourage use positive coping strategies (coloring, talking with staff). If remains in hospital, can offer creative arts therapy  -recommend referral to outpt psychiatric care for medication management and psychotherapy. Referral information provided below  -no psychiatric barrier to discharge. Please contact with questions

## 2020-12-09 NOTE — BEHAVIORAL HEALTH ASSESSMENT NOTE - NSBHCONSULTFOLLOWAFTERCARE_PSY_A_CORE FT
Recommend referral to outpatient mental health treatment. Please provide referral information for:    [facility]   Phone: [phone]  Fax: [phone]   [place] [address]    •	[facility]  o	[email or web address]  o	Three locations  ?	[address] [place] [address]  Phone: [phone]  ?	[facility] at [address] [place] [address]  Phone: [phone]  ?	[facility] at [address] [place] [address]  Phone: [phone]

## 2020-12-09 NOTE — BEHAVIORAL HEALTH ASSESSMENT NOTE - SUICIDE PROTECTIVE FACTORS
Responsibility to family and others/Identifies reasons for living/Has future plans Responsibility to family and others/Beloved pets/Has future plans/Identifies reasons for living

## 2020-12-09 NOTE — BEHAVIORAL HEALTH ASSESSMENT NOTE - OTHER
moving all extremities. motor exam deferred due to covid precautions stable posture, gait not assessed reactively labile, tearful when discussing stressors focused on multiple recent stressors, difficulty with affect regulation when under stress. no voiced delusions or paranoia. no hopelessness or helpelssness, no SI/HI daily cannabis use chronic back pain now s/p surgery, not engaged in mental health care since February, daily cannabis use, legal problems involving daughter focused on multiple recent stressors, difficulty with affect regulation when under stress. simple and concrete. no voiced delusions or paranoia. no hopelessness or helpelssness, no SI/HI children, grandchildren

## 2020-12-09 NOTE — PHYSICAL THERAPY INITIAL EVALUATION ADULT - GENERAL OBSERVATIONS, REHAB EVAL
Pt. received in semi-supine in NAD, (+)heplock, (+)LSO (donned for ambulation), cleared by PHILIP Yanez. Pt. left as found in NAD, all lines intact, call bell within reach, RN made aware. FIM: 2

## 2020-12-09 NOTE — BEHAVIORAL HEALTH ASSESSMENT NOTE - DETAILS
pt denies any current or recent SI/intent/plan or attempts. Reports h/o passive SI when upset/overwhelmed reports distant h/o aggressive behavior toward others while upset, none recent. reports recent h/o aggressive behavior toward property (throwing objects around room when upset). Denies any current or past HI daughter - ADHD, ?Bipolar, intellectual disability

## 2020-12-09 NOTE — DISCHARGE NOTE PROVIDER - HOSPITAL COURSE
Admitted 12/8/20  Surgery L4-S1 PSF  Gabriella-op Antibiotics  Pain control  DVT prophylaxis  OOB/Physical Therapy

## 2020-12-09 NOTE — PHYSICAL THERAPY INITIAL EVALUATION ADULT - MANUAL MUSCLE TESTING RESULTS, REHAB EVAL
MMT not fully accurate 2/2 pain (B/L knee extension/flexion >3+/5, hip flexion not assessed 2/2 spinal precautions); B/L DF/PF 5/5, B/L LE grossly assessed as >3+/5 based on functional mobility against gravity

## 2020-12-09 NOTE — PHYSICAL THERAPY INITIAL EVALUATION ADULT - CRITERIA FOR SKILLED THERAPEUTIC INTERVENTIONS
therapy frequency/anticipated equipment needs at discharge/impairments found/risk reduction/prevention/rehab potential/functional limitations in following categories/anticipated discharge recommendation

## 2020-12-09 NOTE — DISCHARGE NOTE PROVIDER - NSDCFUADDINST_GEN_ALL_CORE_FT
Take antibiotic with food for 7 days.  No strenuous activity (bending/twisting), heavy lifting, driving or returning to work until cleared by MD.  Change dressing daily with gauze/tape till post-op day #5, then leave incision open to air.  You may shower post-op day#5, keep incision clean and dry.   Try to have regular bowel movements, take stool softener or laxative if necessary.  May apply ice to affected areas to decrease swelling.  Call to schedule an appt with Dr. Jewell for follow up, if you have staples or sutures they will be removed in office.  Contact your doctor if you experience: fever greater than 101.5, chills, chest pain, difficulty breathing, redness or excessive drainage around the incision, other concerns.  Follow up with your primary care provider.     Take antibiotic with food for 7 days.  Follow up with psychiatrist outpatient for history of Depression.  No strenuous activity (bending/twisting), heavy lifting, driving or returning to work until cleared by MD.  Change dressing daily with gauze/tape till post-op day #5, then leave incision open to air.  You may shower post-op day#5, keep incision clean and dry.   Try to have regular bowel movements, take stool softener or laxative if necessary.  May apply ice to affected areas to decrease swelling.  Call to schedule an appt with Dr. Jewell for follow up, if you have staples or sutures they will be removed in office.  Contact your doctor if you experience: fever greater than 101.5, chills, chest pain, difficulty breathing, redness or excessive drainage around the incision, other concerns.  Follow up with your primary care provider.     Take antibiotic with food for 7 days.  Follow up with psychiatrist outpatient for history of Depression and SI. Information provided by Psychiatrist.  No strenuous activity (bending/twisting), heavy lifting, driving or returning to work until cleared by MD.  Change dressing daily with gauze/tape till post-op day #5, then leave incision open to air.  You may shower post-op day#5, keep incision clean and dry.   Try to have regular bowel movements, take stool softener or laxative if necessary.  May apply ice to affected areas to decrease swelling.  Call to schedule an appt with Dr. Jewell for follow up, if you have staples or sutures they will be removed in office.  Contact your doctor if you experience: fever greater than 101.5, chills, chest pain, difficulty breathing, redness or excessive drainage around the incision, other concerns.  Follow up with your primary care provider.

## 2020-12-09 NOTE — CONSULT NOTE ADULT - SUBJECTIVE AND OBJECTIVE BOX
Patient is a 44y old  Female who presents with a chief complaint of low back pain (09 Dec 2020 09:03)      HPI:  45yo f with low back pain x   Presents today for elective PSF L4-S1.  (08 Dec 2020 08:10)    Seen POD1. No complaints, pain controlled. Eager to work w/ PT.     Stopped seeing her PCP so not on BP meds, would not like to start here.     Asking to restart her psych meds but she does not know the name or diagnoses, only knows gabapentin.     REVIEW OF SYSTEMS mildly anxious waiting in room, otherwise negative      General:	    Skin/Breast:  	  Ophthalmologic:  	  ENMT:	    Respiratory and Thorax:  	  Cardiovascular:	    Gastrointestinal:	    Genitourinary:	    Musculoskeletal:	    Neurological:	    Psychiatric:	    Hematology/Lymphatics:	    Endocrine:	    Allergic/Immunologic:	    Allergies    penicillin (Unknown)    Intolerances        Home Medications:      PAST MEDICAL & SURGICAL HISTORY:  Vitamin D deficiency    HLD (hyperlipidemia)    HTN (hypertension)    Radiculopathy of lumbar region    History of foot surgery  left    History of shoulder surgery  right        FAMILY HISTORY: n/a      SOCIAL HISTORY: no smoking, used to work security, 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      Vital Signs Last 24 Hrs  T(C): 36.6 (09 Dec 2020 08:42), Max: 36.6 (08 Dec 2020 21:31)  T(F): 97.9 (09 Dec 2020 08:42), Max: 97.9 (09 Dec 2020 08:42)  HR: 72 (09 Dec 2020 08:42) (58 - 93)  BP: 103/52 (09 Dec 2020 08:42) (96/55 - 167/74)  BP(mean): 72 (08 Dec 2020 20:00) (70 - 108)  RR: 16 (09 Dec 2020 08:42) (8 - 22)  SpO2: 95% (09 Dec 2020 08:42) (95% - 100%)   I&O's Detail    08 Dec 2020 07:01  -  09 Dec 2020 07:00  --------------------------------------------------------  IN:    Lactated Ringers: 480 mL  Total IN: 480 mL    OUT:    Accordian (mL): 40 mL    Voided (mL): 1390 mL  Total OUT: 1430 mL    Total NET: -950 mL      09 Dec 2020 07:01  -  09 Dec 2020 10:55  --------------------------------------------------------  IN:    Oral Fluid: 240 mL  Total IN: 240 mL    OUT:    Voided (mL): 350 mL  Total OUT: 350 mL    Total NET: -110 mL        Daily Height in cm: 160.02 (08 Dec 2020 11:00)    Daily     Physical Exam:   GEN: NAD, AAOx3  HEENT: MMM, no icterus  CV: S1 S2 RRR, no MRG  Lung: CTAB  Abd: soft NT ND +BS  Ext: no c/c/e  Neuro: no focal neuro deficit    MEDICATIONS  (STANDING):  acetaminophen   Tablet .. 650 milliGRAM(s) Oral every 6 hours  cyclobenzaprine 5 milliGRAM(s) Oral every 8 hours  dexAMETHasone  IVPB 10 milliGRAM(s) IV Intermittent every 8 hours  gabapentin 300 milliGRAM(s) Oral daily  influenza   Vaccine 0.5 milliLiter(s) IntraMuscular once  lactated ringers. 1000 milliLiter(s) (120 mL/Hr) IV Continuous <Continuous>  magnesium sulfate  IVPB 2 Gram(s) IV Intermittent every 1 hour  polyethylene glycol 3350 17 Gram(s) Oral daily    MEDICATIONS  (PRN):  bisacodyl Suppository 10 milliGRAM(s) Rectal daily PRN Constipation  HYDROmorphone  Injectable 0.5 milliGRAM(s) IV Push every 15 minutes PRN breakthrough pain  HYDROmorphone  Injectable 0.5 milliGRAM(s) IV Push every 4 hours PRN breakthrough pain  ondansetron Injectable 4 milliGRAM(s) IV Push every 6 hours PRN Nausea  oxyCODONE    IR 5 milliGRAM(s) Oral every 4 hours PRN Moderate Pain (4 - 6)  oxyCODONE    IR 10 milliGRAM(s) Oral every 4 hours PRN Severe Pain (7 - 10)                LABS:    12-09    135  |  x   |  x   ----------------------------<  x   x    |  x   |  0.94    Ca    8.6      08 Dec 2020 17:41    TPro  x   /  Alb  x   /  TBili  0.3  /  DBili  x   /  AST  x   /  ALT  x   /  AlkPhos  x   12-09        PT/INR - ( 09 Dec 2020 07:22 )   PT: 12.5 sec;   INR: 1.04            CAPILLARY BLOOD GLUCOSE          RADIOLOGY & ADDITIONAL STUDIES:

## 2020-12-09 NOTE — DISCHARGE NOTE PROVIDER - NSDCFUADDAPPT_GEN_ALL_CORE_FT
[facility]   Phone: [phone]  Fax: [phone]  [address] [place] [address]    •	[facility]  o	[email or web address]  o	Three locations  ?	[address] [place], [place] [address]  Phone: [phone]  ?	[address] [facility] at [address] [place] [address]  Phone: [phone]  ?	[address] [facility] at [address] [place] [address]  Phone: [phone] [facility]   Phone: [phone]  Fax: [phone]  [address] [place] [address]    •	[facility]  o	[email or web address]  o	Three locations  ?	[address] [place] [address]  Phone: [phone]  ?	[address] [facility] at [address] [place] [address]  Phone: [phone]  ?	[address] [facility] at [address] [place] [address]  Phone: [phone]  f/u with Dr. Jewell in 2 weeks

## 2020-12-09 NOTE — DISCHARGE NOTE PROVIDER - NSDCMRMEDTOKEN_GEN_ALL_CORE_FT
Lumbar corset: s/p lumbar spine surgery   acetaminophen 325 mg oral tablet: 2 tab(s) orally every 6 hours  cefadroxil 500 mg oral capsule: 1 cap(s) orally 2 times a day for 1 week  cyclobenzaprine 5 mg oral tablet: 1 tab(s) orally every 8 hours MDD:3   gabapentin 300 mg oral capsule: 1 cap(s) orally once a day  Medrol Dosepak 4 mg oral tablet: 1 -6 tablets orally once a day as directed on pack  oxyCODONE 5 mg oral tablet: 1 tab(s) orally every 4 -6 hours, As Needed - for severe pain MDD:6

## 2020-12-09 NOTE — DISCHARGE NOTE PROVIDER - NSDCCPTREATMENT_GEN_ALL_CORE_FT
PRINCIPAL PROCEDURE  Procedure: Decompression, spine, lumbar, with fusion  Findings and Treatment:        PRINCIPAL PROCEDURE  Procedure: Decompression, spine, lumbar, with fusion  Findings and Treatment: radiculopathy of the lumbar region

## 2020-12-10 VITALS — WEIGHT: 173.06 LBS

## 2020-12-10 LAB
ANION GAP SERPL CALC-SCNC: 10 MMOL/L — SIGNIFICANT CHANGE UP (ref 5–17)
BASOPHILS # BLD AUTO: 0.01 K/UL — SIGNIFICANT CHANGE UP (ref 0–0.2)
BASOPHILS NFR BLD AUTO: 0.1 % — SIGNIFICANT CHANGE UP (ref 0–2)
BUN SERPL-MCNC: 17 MG/DL — SIGNIFICANT CHANGE UP (ref 7–23)
CALCIUM SERPL-MCNC: 9 MG/DL — SIGNIFICANT CHANGE UP (ref 8.4–10.5)
CHLORIDE SERPL-SCNC: 100 MMOL/L — SIGNIFICANT CHANGE UP (ref 96–108)
CO2 SERPL-SCNC: 26 MMOL/L — SIGNIFICANT CHANGE UP (ref 22–31)
CREAT SERPL-MCNC: 0.87 MG/DL — SIGNIFICANT CHANGE UP (ref 0.5–1.3)
EOSINOPHIL # BLD AUTO: 0 K/UL — SIGNIFICANT CHANGE UP (ref 0–0.5)
EOSINOPHIL NFR BLD AUTO: 0 % — SIGNIFICANT CHANGE UP (ref 0–6)
GLUCOSE SERPL-MCNC: 102 MG/DL — HIGH (ref 70–99)
HCT VFR BLD CALC: 31.8 % — LOW (ref 34.5–45)
HGB BLD-MCNC: 10.1 G/DL — LOW (ref 11.5–15.5)
IMM GRANULOCYTES NFR BLD AUTO: 0.7 % — SIGNIFICANT CHANGE UP (ref 0–1.5)
LYMPHOCYTES # BLD AUTO: 1.73 K/UL — SIGNIFICANT CHANGE UP (ref 1–3.3)
LYMPHOCYTES # BLD AUTO: 13.3 % — SIGNIFICANT CHANGE UP (ref 13–44)
MCHC RBC-ENTMCNC: 29.5 PG — SIGNIFICANT CHANGE UP (ref 27–34)
MCHC RBC-ENTMCNC: 31.8 GM/DL — LOW (ref 32–36)
MCV RBC AUTO: 93 FL — SIGNIFICANT CHANGE UP (ref 80–100)
MONOCYTES # BLD AUTO: 1.26 K/UL — HIGH (ref 0–0.9)
MONOCYTES NFR BLD AUTO: 9.7 % — SIGNIFICANT CHANGE UP (ref 2–14)
NEUTROPHILS # BLD AUTO: 9.89 K/UL — HIGH (ref 1.8–7.4)
NEUTROPHILS NFR BLD AUTO: 76.2 % — SIGNIFICANT CHANGE UP (ref 43–77)
NRBC # BLD: 0 /100 WBCS — SIGNIFICANT CHANGE UP (ref 0–0)
PLATELET # BLD AUTO: 247 K/UL — SIGNIFICANT CHANGE UP (ref 150–400)
POTASSIUM SERPL-MCNC: 4.6 MMOL/L — SIGNIFICANT CHANGE UP (ref 3.5–5.3)
POTASSIUM SERPL-SCNC: 4.6 MMOL/L — SIGNIFICANT CHANGE UP (ref 3.5–5.3)
RBC # BLD: 3.42 M/UL — LOW (ref 3.8–5.2)
RBC # FLD: 14 % — SIGNIFICANT CHANGE UP (ref 10.3–14.5)
SODIUM SERPL-SCNC: 136 MMOL/L — SIGNIFICANT CHANGE UP (ref 135–145)
WBC # BLD: 12.98 K/UL — HIGH (ref 3.8–10.5)
WBC # FLD AUTO: 12.98 K/UL — HIGH (ref 3.8–10.5)

## 2020-12-10 PROCEDURE — 72100 X-RAY EXAM L-S SPINE 2/3 VWS: CPT | Mod: 26

## 2020-12-10 PROCEDURE — 99232 SBSQ HOSP IP/OBS MODERATE 35: CPT | Mod: GC

## 2020-12-10 RX ORDER — GABAPENTIN 400 MG/1
1 CAPSULE ORAL
Qty: 0 | Refills: 0 | DISCHARGE
Start: 2020-12-10

## 2020-12-10 RX ORDER — CYCLOBENZAPRINE HYDROCHLORIDE 10 MG/1
1 TABLET, FILM COATED ORAL
Qty: 21 | Refills: 0
Start: 2020-12-10 | End: 2020-12-16

## 2020-12-10 RX ORDER — OXYCODONE HYDROCHLORIDE 5 MG/1
1 TABLET ORAL
Qty: 30 | Refills: 0
Start: 2020-12-10 | End: 2020-12-14

## 2020-12-10 RX ORDER — ACETAMINOPHEN 500 MG
2 TABLET ORAL
Qty: 0 | Refills: 0 | DISCHARGE
Start: 2020-12-10

## 2020-12-10 RX ADMIN — Medication 650 MILLIGRAM(S): at 12:28

## 2020-12-10 RX ADMIN — Medication 650 MILLIGRAM(S): at 11:27

## 2020-12-10 RX ADMIN — Medication 650 MILLIGRAM(S): at 05:23

## 2020-12-10 RX ADMIN — Medication 650 MILLIGRAM(S): at 06:23

## 2020-12-10 RX ADMIN — HYDROMORPHONE HYDROCHLORIDE 0.5 MILLIGRAM(S): 2 INJECTION INTRAMUSCULAR; INTRAVENOUS; SUBCUTANEOUS at 00:02

## 2020-12-10 RX ADMIN — CYCLOBENZAPRINE HYDROCHLORIDE 5 MILLIGRAM(S): 10 TABLET, FILM COATED ORAL at 05:23

## 2020-12-10 RX ADMIN — POLYETHYLENE GLYCOL 3350 17 GRAM(S): 17 POWDER, FOR SOLUTION ORAL at 11:26

## 2020-12-10 RX ADMIN — GABAPENTIN 300 MILLIGRAM(S): 400 CAPSULE ORAL at 11:27

## 2020-12-10 NOTE — DISCHARGE NOTE NURSING/CASE MANAGEMENT/SOCIAL WORK - NSDCPEWEB_GEN_ALL_CORE
[place] website --- [email or web address] Tobacco [email or web address] website --- [email or web address]

## 2020-12-10 NOTE — DIETITIAN INITIAL EVALUATION ADULT. - OTHER CALCULATIONS
IBW used to calculate energy needs due to pt's current body weight exceeding 120% of IBW (150%). Needs adjusted for post-op, BMI. Aim for higher end of kcal/protein ranges.

## 2020-12-10 NOTE — DIETITIAN INITIAL EVALUATION ADULT. - PERSON TAUGHT/METHOD
encouraged increased PO intake with emphasis on lean protein for healing post-op; also reviewed DASH diet- pt appeared receptive/patient instructed/verbal instruction

## 2020-12-10 NOTE — DIETITIAN INITIAL EVALUATION ADULT. - +GENDER
Addended by: CAROLINA MCDONNELL on: 10/29/2020 11:18 AM     Modules accepted: Orders    
Statement Selected

## 2020-12-10 NOTE — PROGRESS NOTE ADULT - SUBJECTIVE AND OBJECTIVE BOX
OVERNIGHT EVENTS:    SUBJECTIVE / INTERVAL HPI: Patient seen and examined at bedside. Drain being removed. No complaints, ready to go home.     VITAL SIGNS:  Vital Signs Last 24 Hrs  T(C): 37.1 (10 Dec 2020 05:03), Max: 37.1 (09 Dec 2020 20:41)  T(F): 98.7 (10 Dec 2020 05:03), Max: 98.7 (09 Dec 2020 20:41)  HR: 72 (10 Dec 2020 10:15) (63 - 72)  BP: 123/73 (10 Dec 2020 10:15) (98/55 - 123/73)  BP(mean): --  RR: 16 (10 Dec 2020 05:03) (16 - 16)  SpO2: 97% (10 Dec 2020 10:15) (96% - 99%)    Physical Exam:   GEN: NAD, AAOx3  HEENT: MMM, no icterus  CV: S1 S2 RRR, no MRG  Lung: CTAB  Abd: soft NT ND +BS  Ext: WWP  Back: drain removed    MEDICATIONS:  MEDICATIONS  (STANDING):  acetaminophen   Tablet .. 650 milliGRAM(s) Oral every 6 hours  cyclobenzaprine 5 milliGRAM(s) Oral every 8 hours  gabapentin 300 milliGRAM(s) Oral daily  influenza   Vaccine 0.5 milliLiter(s) IntraMuscular once  polyethylene glycol 3350 17 Gram(s) Oral daily  senna 2 Tablet(s) Oral at bedtime    MEDICATIONS  (PRN):  bisacodyl Suppository 10 milliGRAM(s) Rectal daily PRN Constipation  HYDROmorphone  Injectable 0.5 milliGRAM(s) IV Push every 4 hours PRN breakthrough pain  ondansetron Injectable 4 milliGRAM(s) IV Push every 6 hours PRN Nausea  oxyCODONE    IR 5 milliGRAM(s) Oral every 4 hours PRN Moderate Pain (4 - 6)  oxyCODONE    IR 10 milliGRAM(s) Oral every 4 hours PRN Severe Pain (7 - 10)      ALLERGIES:  Allergies    penicillin (Unknown)    Intolerances        LABS:                        10.1   12.98 )-----------( 247      ( 10 Dec 2020 06:47 )             31.8     12-10    136  |  100  |  17  ----------------------------<  102<H>  4.6   |  26  |  0.87    Ca    9.0      10 Dec 2020 06:47    TPro  x   /  Alb  x   /  TBili  0.3  /  DBili  x   /  AST  x   /  ALT  x   /  AlkPhos  x   12-09    PT/INR - ( 09 Dec 2020 07:22 )   PT: 12.5 sec;   INR: 1.04              CAPILLARY BLOOD GLUCOSE          RADIOLOGY & ADDITIONAL TESTS: Reviewed.    ASSESSMENT:    PLAN:

## 2020-12-10 NOTE — DISCHARGE NOTE NURSING/CASE MANAGEMENT/SOCIAL WORK - NSDCFUADDAPPT_GEN_ALL_CORE_FT
[facility]   Phone: [phone]  Fax: [phone]  [address] [place] [address]    •	[facility]  o	[email or web address]  o	Three locations  ?	[address] [place] [address]  Phone: [phone]  ?	[address] [facility] at [address] [place] [address]  Phone: [phone]  ?	[address] [facility] at [address] [place] [address]  Phone: [phone]  f/u with Dr. Jewell in 2 weeks

## 2020-12-10 NOTE — DISCHARGE NOTE NURSING/CASE MANAGEMENT/SOCIAL WORK - NSDCPEEMAIL_GEN_ALL_CORE
[facility] for Tobacco Control email [email or web address]

## 2020-12-10 NOTE — PROGRESS NOTE ADULT - SUBJECTIVE AND OBJECTIVE BOX
Ortho Note      Subjective:  Pt comfortable without complaints, pain controlled with current pain medication regimen.   Denies CP, SOB, N/V, numbness/tingling       Vital Signs Last 24 Hrs  T(C): 37.1 (12-10-20 @ 05:03), Max: 37.1 (12-10-20 @ 05:03)  T(F): 98.7 (12-10-20 @ 05:03), Max: 98.7 (12-10-20 @ 05:03)  HR: 63 (12-10-20 @ 05:03) (63 - 63)  BP: 98/55 (12-10-20 @ 05:03) (98/55 - 98/55)  BP(mean): --  RR: 16 (12-10-20 @ 05:03) (16 - 16)  SpO2: 97% (12-10-20 @ 05:03) (97% - 97%)  AVSS      Objective:    Physical Exam:  General: Pt Alert and oriented, NAD  Lumbar DSG C/D/I, x1 drain  Pulses: +2 presents to ble  Sensation: reports R heel numbness,   Motor: EHL/FHL/TA/GS 5/5, bilateral lower extremities                          10.1   12.98 )-----------( 247      ( 10 Dec 2020 06:47 )             31.8   10 Dec 2020 06:47    136    |  100    |  17     ----------------------------<  102    4.6     |  26     |  0.87     Ca    9.0        10 Dec 2020 06:47    TPro  x      /  Alb  x      /  TBili  0.3    /  DBili  x      /  AST  x      /  ALT  x      /  AlkPhos  x      09 Dec 2020 07:22      Plan of Care:  A/P: 44yFemale s/p L4-S1 PSF POD#1   - Stable  - Pain Control- Oxycodone 5-10mg Po Q4h prn moderate to severe pain, Dilaudid 0.5mg Q4h prn breakthrough pain, Flexeril 5mg PO Q8h standing gabapentin 300mg Po TID  - DVT ppx:   - PT, WBS:  WBAT, spinal precautions  - Oob to chair for meals as tolerated, IS,   - Dispo- home with PT       Ortho Pager [phone] Ortho Note      Subjective:  Pt comfortable without complaints, pain controlled with current pain medication regimen.   Denies CP, SOB, N/V, numbness/tingling       Vital Signs Last 24 Hrs  T(C): 37.1 (12-10-20 @ 05:03), Max: 37.1 (12-10-20 @ 05:03)  T(F): 98.7 (12-10-20 @ 05:03), Max: 98.7 (12-10-20 @ 05:03)  HR: 63 (12-10-20 @ 05:03) (63 - 63)  BP: 98/55 (12-10-20 @ 05:03) (98/55 - 98/55)  BP(mean): --  RR: 16 (12-10-20 @ 05:03) (16 - 16)  SpO2: 97% (12-10-20 @ 05:03) (97% - 97%)  AVSS      Objective:    Physical Exam:  General: Pt Alert and oriented, NAD  Lumbar DSG C/D/I, Drain d/c'd using aseptic technique, area cleaned with chlorhexidine  Pulses: +2 presents to ble  Sensation: reports chronic R heel numbness, otherwise sensation intact   Motor: EHL/FHL/TA/GS 5/5, bilateral lower extremities                          10.1   12.98 )-----------( 247      ( 10 Dec 2020 06:47 )             31.8   10 Dec 2020 06:47    136    |  100    |  17     ----------------------------<  102    4.6     |  26     |  0.87     Ca    9.0        10 Dec 2020 06:47    TPro  x      /  Alb  x      /  TBili  0.3    /  DBili  x      /  AST  x      /  ALT  x      /  AlkPhos  x      09 Dec 2020 07:22      Plan of Care:  A/P: 44yFemale s/p L4-S1 PSF POD#1   - Stable  - Pain Control- Oxycodone 5-10mg Po Q4h prn moderate to severe pain, Dilaudid 0.5mg Q4h prn breakthrough pain, Flexeril 5mg PO Q8h standing gabapentin 300mg Po TID  - DVT ppx: scds  - PT, WBS:  WBAT, spinal precautions  - Oob to chair for meals as tolerated, IS, bowel regimen   - Dispo- home with PT, discharge today      Ortho Pager [phone]

## 2020-12-10 NOTE — PROGRESS NOTE ADULT - ASSESSMENT
44 year old F w/ HTN, HLD s/p elective PSF, doing well post-op, medicine following for co-management.     #Post-op care: c/w pain control, c/w bowel regimen, c/w IS  #Anemia: acute blood loss 2/2 surgery, asymptomatic, CBC w/ PCP  #Leukocytosis: reactive to surgery, no s/s of infection  #HTN: well controlled here off BP meds, outpatient PCP f/u  #Psychiatric condition: obtain collateral and restart her home meds, f/u psych recs  #DVT ppx: per ortho  #Dispo: home today

## 2020-12-10 NOTE — DISCHARGE NOTE NURSING/CASE MANAGEMENT/SOCIAL WORK - PATIENT PORTAL LINK FT
You can access the FollowMyHealth Patient Portal offered by [facility] by registering at the following website: [email or web address]. By joining [facility]’s FollowMyHealth portal, you will also be able to view your health information using other applications (apps) compatible with our system.

## 2020-12-15 DIAGNOSIS — F32.9 MAJOR DEPRESSIVE DISORDER, SINGLE EPISODE, UNSPECIFIED: ICD-10-CM

## 2020-12-15 DIAGNOSIS — M51.16 INTERVERTEBRAL DISC DISORDERS WITH RADICULOPATHY, LUMBAR REGION: ICD-10-CM

## 2020-12-15 DIAGNOSIS — D72.829 ELEVATED WHITE BLOOD CELL COUNT, UNSPECIFIED: ICD-10-CM

## 2020-12-15 DIAGNOSIS — F12.10 CANNABIS ABUSE, UNCOMPLICATED: ICD-10-CM

## 2020-12-15 DIAGNOSIS — E78.5 HYPERLIPIDEMIA, UNSPECIFIED: ICD-10-CM

## 2020-12-15 DIAGNOSIS — I10 ESSENTIAL (PRIMARY) HYPERTENSION: ICD-10-CM

## 2020-12-15 DIAGNOSIS — E55.9 VITAMIN D DEFICIENCY, UNSPECIFIED: ICD-10-CM

## 2020-12-15 DIAGNOSIS — E66.09 OTHER OBESITY DUE TO EXCESS CALORIES: ICD-10-CM

## 2020-12-15 DIAGNOSIS — Z88.0 ALLERGY STATUS TO PENICILLIN: ICD-10-CM

## 2020-12-15 DIAGNOSIS — K21.9 GASTRO-ESOPHAGEAL REFLUX DISEASE WITHOUT ESOPHAGITIS: ICD-10-CM

## 2020-12-15 DIAGNOSIS — D62 ACUTE POSTHEMORRHAGIC ANEMIA: ICD-10-CM

## 2020-12-15 DIAGNOSIS — F41.9 ANXIETY DISORDER, UNSPECIFIED: ICD-10-CM

## 2020-12-28 PROCEDURE — C1889: CPT

## 2020-12-28 PROCEDURE — 86900 BLOOD TYPING SEROLOGIC ABO: CPT

## 2020-12-28 PROCEDURE — 76000 FLUOROSCOPY <1 HR PHYS/QHP: CPT

## 2020-12-28 PROCEDURE — 86850 RBC ANTIBODY SCREEN: CPT

## 2020-12-28 PROCEDURE — 82565 ASSAY OF CREATININE: CPT

## 2020-12-28 PROCEDURE — 97161 PT EVAL LOW COMPLEX 20 MIN: CPT

## 2020-12-28 PROCEDURE — 85025 COMPLETE CBC W/AUTO DIFF WBC: CPT

## 2020-12-28 PROCEDURE — 72100 X-RAY EXAM L-S SPINE 2/3 VWS: CPT

## 2020-12-28 PROCEDURE — 82247 BILIRUBIN TOTAL: CPT

## 2020-12-28 PROCEDURE — 84295 ASSAY OF SERUM SODIUM: CPT

## 2020-12-28 PROCEDURE — 97530 THERAPEUTIC ACTIVITIES: CPT

## 2020-12-28 PROCEDURE — C1713: CPT

## 2020-12-28 PROCEDURE — 86901 BLOOD TYPING SEROLOGIC RH(D): CPT

## 2020-12-28 PROCEDURE — 85610 PROTHROMBIN TIME: CPT

## 2020-12-28 PROCEDURE — 36415 COLL VENOUS BLD VENIPUNCTURE: CPT

## 2020-12-28 PROCEDURE — 80048 BASIC METABOLIC PNL TOTAL CA: CPT
